# Patient Record
Sex: MALE | Race: BLACK OR AFRICAN AMERICAN | NOT HISPANIC OR LATINO | Employment: FULL TIME | URBAN - METROPOLITAN AREA
[De-identification: names, ages, dates, MRNs, and addresses within clinical notes are randomized per-mention and may not be internally consistent; named-entity substitution may affect disease eponyms.]

---

## 2023-06-28 ENCOUNTER — HOSPITAL ENCOUNTER (EMERGENCY)
Facility: CLINIC | Age: 23
Discharge: HOME | End: 2023-06-28
Attending: EMERGENCY MEDICINE | Admitting: EMERGENCY MEDICINE

## 2023-06-28 VITALS
RESPIRATION RATE: 16 BRPM | SYSTOLIC BLOOD PRESSURE: 140 MMHG | DIASTOLIC BLOOD PRESSURE: 92 MMHG | HEART RATE: 59 BPM | OXYGEN SATURATION: 99 %

## 2023-06-28 DIAGNOSIS — Z53.21 PATIENT LEFT WITHOUT BEING SEEN: ICD-10-CM

## 2023-06-28 PROCEDURE — 99281 EMR DPT VST MAYX REQ PHY/QHP: CPT

## 2023-06-28 ASSESSMENT — ACTIVITIES OF DAILY LIVING (ADL): ADLS_ACUITY_SCORE: 33

## 2023-06-29 NOTE — ED PROVIDER NOTES
History     Chief Complaint   Patient presents with     Dizziness     X a week ago. Intermittent dizziness.      HPI  Bobby Farrell is a 23 year old male who presented the emergency department for evaluation of 1 week of intermittent dizziness who left emergency department after triage prior to evaluation by myself/MD.  On arrival when I came to evaluate him after he was roomed, he had left and I did not see him.    Allergies:  No Known Allergies    Problem List:    There are no problems to display for this patient.       Past Medical History:    No past medical history on file.    Past Surgical History:    No past surgical history on file.    Family History:    No family history on file.    Social History:  Marital Status:  Single [1]        Medications:    No current outpatient medications on file.        Review of Systems  Not performed, LWBS by MD    Physical Exam   BP: (!) 140/92  Pulse: 59  Resp: 16  SpO2: 99 %      Physical Exam  Not performed, LWBS by MD    ED Course           Procedures                  No results found for this or any previous visit (from the past 24 hour(s)).    Medications - No data to display    Assessments & Plan (with Medical Decision Making)   23 year old male who presented the emergency department for evaluation of 1 week of intermittent dizziness who left the emergency department after triage, prior to evaluation by myself/MD.    I have reviewed the nursing notes.    I have reviewed the findings, diagnosis, plan and need for follow up with the patient.      There are no discharge medications for this patient.      Final diagnoses:   Patient left without being seen - LWBS by MD after triage       6/28/2023   [facility] EMERGENCY DEPT     Bon Walker MD  06/28/23 1832